# Patient Record
Sex: FEMALE | NOT HISPANIC OR LATINO
[De-identification: names, ages, dates, MRNs, and addresses within clinical notes are randomized per-mention and may not be internally consistent; named-entity substitution may affect disease eponyms.]

---

## 2021-01-13 PROBLEM — Z00.00 ENCOUNTER FOR PREVENTIVE HEALTH EXAMINATION: Status: ACTIVE | Noted: 2021-01-13

## 2021-01-14 ENCOUNTER — APPOINTMENT (OUTPATIENT)
Dept: CARDIOLOGY | Facility: CLINIC | Age: 71
End: 2021-01-14
Payer: MEDICARE

## 2021-01-14 VITALS
OXYGEN SATURATION: 99 % | HEIGHT: 58.5 IN | SYSTOLIC BLOOD PRESSURE: 152 MMHG | BODY MASS INDEX: 31.26 KG/M2 | WEIGHT: 153 LBS | HEART RATE: 75 BPM | DIASTOLIC BLOOD PRESSURE: 88 MMHG
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
## 2021-01-14 DIAGNOSIS — F41.9 ANXIETY DISORDER, UNSPECIFIED: ICD-10-CM

## 2021-01-14 DIAGNOSIS — Z78.9 OTHER SPECIFIED HEALTH STATUS: ICD-10-CM

## 2021-01-14 PROCEDURE — 99214 OFFICE O/P EST MOD 30 MIN: CPT

## 2021-01-14 RX ORDER — AMLODIPINE BESYLATE 5 MG/1
5 TABLET ORAL DAILY
Qty: 90 | Refills: 1 | Status: ACTIVE | COMMUNITY

## 2021-01-14 NOTE — PHYSICAL EXAM
[General Appearance - Well Developed] : well developed [Normal Appearance] : normal appearance [Well Groomed] : well groomed [General Appearance - Well Nourished] : well nourished [No Deformities] : no deformities [General Appearance - In No Acute Distress] : no acute distress [FreeTextEntry1] : no JVD [Respiration, Rhythm And Depth] : normal respiratory rhythm and effort [Exaggerated Use Of Accessory Muscles For Inspiration] : no accessory muscle use [Auscultation Breath Sounds / Voice Sounds] : lungs were clear to auscultation bilaterally [Heart Rate And Rhythm] : heart rate and rhythm were normal [Heart Sounds] : normal S1 and S2 [Murmurs] : no murmurs present [Abdomen Soft] : soft [Abdomen Tenderness] : non-tender [Abdomen Mass (___ Cm)] : no abdominal mass palpated [Abnormal Walk] : normal gait [Gait - Sufficient For Exercise Testing] : the gait was sufficient for exercise testing [Nail Clubbing] : no clubbing of the fingernails [Cyanosis, Localized] : no localized cyanosis [Petechial Hemorrhages (___cm)] : no petechial hemorrhages [Skin Color & Pigmentation] : normal skin color and pigmentation [No Venous Stasis] : no venous stasis [] : no rash [Skin Lesions] : no skin lesions [No Skin Ulcers] : no skin ulcer [No Xanthoma] : no  xanthoma was observed [Oriented To Time, Place, And Person] : oriented to person, place, and time [Affect] : the affect was normal [Mood] : the mood was normal [No Anxiety] : not feeling anxious

## 2021-01-14 NOTE — HISTORY OF PRESENT ILLNESS
[FreeTextEntry1] : EUSEBIO ANDRADE  is a 70 year F  who presents today Jan 14, 2021 in clinical follow-up for evaluation at [facility] for uncontrolled blood pressure. Now taking Amlodipine 5mg QD. There were several ER visits for high blood pressure. She is asymptomatic from a cardiovascular and arrhythmia standpoint. She has not been drinking any alcohol. Exercising on a regular basis with walking and riding bike with no new exertional complaints.\par \par Today she denies chest pain, pressure, unusual shortness of breath, lightheadedness, dizziness, near syncope or syncope.\par \par There is no prior history of myocardial infarction, coronary revascularization, history of ischemic heart disease, or symptomatic congestive heart failure. There is no history of symptomatic arrhythmias including atrial fibrillation.  \par Denies history of DM or hyperlipidemia. \par Denies history of tobacco use. \par \par \par \par TESTING:\par \par 1/9/2021 WBC 7.68, Hgb 14.3, Hct 43, plat 250, Sodium 138, potassium 4.8, BUN 16, Creat 0.62\par \par Renal doppler 1/10/2021 no sonographic evidence to suggest renal artery stenosisi

## 2021-01-20 ENCOUNTER — NON-APPOINTMENT (OUTPATIENT)
Age: 71
End: 2021-01-20

## 2021-02-04 ENCOUNTER — APPOINTMENT (OUTPATIENT)
Dept: CARDIOLOGY | Facility: CLINIC | Age: 71
End: 2021-02-04
Payer: MEDICARE

## 2021-02-04 VITALS
OXYGEN SATURATION: 98 % | HEIGHT: 70.5 IN | BODY MASS INDEX: 21.38 KG/M2 | HEART RATE: 72 BPM | WEIGHT: 151 LBS | TEMPERATURE: 98 F | SYSTOLIC BLOOD PRESSURE: 132 MMHG | DIASTOLIC BLOOD PRESSURE: 90 MMHG

## 2021-02-04 DIAGNOSIS — I10 ESSENTIAL (PRIMARY) HYPERTENSION: ICD-10-CM

## 2021-02-04 PROCEDURE — 99214 OFFICE O/P EST MOD 30 MIN: CPT

## 2021-02-04 NOTE — REASON FOR VISIT
[Follow-Up - Clinic] : a clinic follow-up of [FreeTextEntry2] : cardiology preop colonscopy Dr Ybarra 3/16/21 [facility], hx colon polyp, labile HTN on amlodipine [FreeTextEntry1] : Shayla Bernardo is a 70-year-old female with history of labile hypertension, GERD, obesity, varicose vein laser surgery, colon polyps.\par \par Patient is walking 30 minutes without exertional symptoms.  Average home blood pressures at guideline goal on amlodipine 5 mg daily.  Patient is currently following a low-sodium diet no alcohol or caffeine.  Patient does have anxiety and is benefiting from meditation.\par \par Labs 1/9/2021 WBC 7.68, Hgb 14.3, Hct 43, plat 250, Sodium 138, potassium 4.8, BUN 16, Creat 0.62\par \par Renal doppler 1/10/2021 no sonographic evidence to suggest renal artery stenosis

## 2021-02-04 NOTE — PHYSICAL EXAM
[General Appearance - Well Developed] : well developed [Normal Appearance] : normal appearance [Well Groomed] : well groomed [No Deformities] : no deformities [General Appearance - Well Nourished] : well nourished [General Appearance - In No Acute Distress] : no acute distress [Respiration, Rhythm And Depth] : normal respiratory rhythm and effort [Exaggerated Use Of Accessory Muscles For Inspiration] : no accessory muscle use [Auscultation Breath Sounds / Voice Sounds] : lungs were clear to auscultation bilaterally [Heart Rate And Rhythm] : heart rate and rhythm were normal [Murmurs] : no murmurs present [Heart Sounds] : normal S1 and S2 [Abdomen Soft] : soft [Abdomen Tenderness] : non-tender [Abdomen Mass (___ Cm)] : no abdominal mass palpated [Abnormal Walk] : normal gait [Gait - Sufficient For Exercise Testing] : the gait was sufficient for exercise testing [Nail Clubbing] : no clubbing of the fingernails [Cyanosis, Localized] : no localized cyanosis [Petechial Hemorrhages (___cm)] : no petechial hemorrhages [Skin Color & Pigmentation] : normal skin color and pigmentation [] : no rash [No Venous Stasis] : no venous stasis [Skin Lesions] : no skin lesions [No Skin Ulcers] : no skin ulcer [No Xanthoma] : no  xanthoma was observed [Oriented To Time, Place, And Person] : oriented to person, place, and time [Affect] : the affect was normal [Mood] : the mood was normal [No Anxiety] : not feeling anxious [FreeTextEntry1] : no JVD

## 2021-02-04 NOTE — ASSESSMENT
[FreeTextEntry1] : Shayla Bernardo is a 70-year-old female with medical history detailed above and active medical issues including:\par \par - Cardiology preop colonscopy Dr Ybarra 3/16/21 [facility].  Patient is optimized from a cardiovascular perspective and may proceed with surgery.  Patient currently not on anticoagulation or aspirin therapy.  Continue amlodipine up to the time of surgery for BP control.  Please call with any further questions.\par \par -Labile hypertension guideline goal on amlodipine 5 mg daily.\par \par - History of colon polyps\par \par - Reflux GERD\par \par -Obesity.  Discussed calorie reduction and increased exercise as a weight reduction strategy.\par \par -Leg varicose vein laser surgery Dr Wheat\par \par Advised patient to follow active lifestyle with regular cardiovascular exercise. Patient educated on lifestyle and diet modification with low sodium low fat diet and avoidance of excessive alcohol. Patient is aware to call with any symptoms or concerns. \par \par Patient will have 2-D echocardiogram to assess LV systolic function, structural heart disease.  Carotid and abdominal ultrasound to assess for obstructive PAD  with TEB followup..  Cardiology follow-up 6 months. \par \par Shayla Bernardo will follow up with Dr. Frank Albrecht for primary care

## 2021-02-22 ENCOUNTER — TRANSCRIPTION ENCOUNTER (OUTPATIENT)
Age: 71
End: 2021-02-22

## 2021-03-09 ENCOUNTER — APPOINTMENT (OUTPATIENT)
Dept: CARDIOLOGY | Facility: CLINIC | Age: 71
End: 2021-03-09

## 2021-03-29 ENCOUNTER — APPOINTMENT (OUTPATIENT)
Dept: CARDIOLOGY | Facility: CLINIC | Age: 71
End: 2021-03-29
Payer: MEDICARE

## 2021-03-29 PROCEDURE — 93306 TTE W/DOPPLER COMPLETE: CPT

## 2021-03-29 PROCEDURE — 93880 EXTRACRANIAL BILAT STUDY: CPT

## 2021-03-29 PROCEDURE — 93979 VASCULAR STUDY: CPT

## 2021-04-06 ENCOUNTER — APPOINTMENT (OUTPATIENT)
Dept: CARDIOLOGY | Facility: CLINIC | Age: 71
End: 2021-04-06
Payer: MEDICARE

## 2021-04-06 PROCEDURE — 99443: CPT | Mod: 95

## 2021-04-06 RX ORDER — NEOMYCIN AND POLYMYXIN B SULFATES AND DEXAMETHASONE 3.5; 10000; 1 MG/G; [IU]/G; MG/G
3.5-10000-0.1 OINTMENT OPHTHALMIC
Qty: 4 | Refills: 0 | Status: ACTIVE | COMMUNITY
Start: 2020-11-18

## 2021-04-06 RX ORDER — DORZOLAMIDE HYDROCHLORIDE TIMOLOL MALEATE 20; 5 MG/ML; MG/ML
22.3-6.8 SOLUTION/ DROPS OPHTHALMIC
Qty: 10 | Refills: 0 | Status: ACTIVE | COMMUNITY
Start: 2021-01-19

## 2021-04-06 RX ORDER — LATANOPROST/PF 0.005 %
0.01 DROPS OPHTHALMIC (EYE)
Qty: 2 | Refills: 0 | Status: ACTIVE | COMMUNITY
Start: 2021-01-19

## 2021-04-06 RX ORDER — METOPROLOL TARTRATE 50 MG/1
50 TABLET, FILM COATED ORAL
Qty: 30 | Refills: 0 | Status: DISCONTINUED | COMMUNITY
Start: 2021-01-10 | End: 2021-04-06

## 2021-09-09 ENCOUNTER — APPOINTMENT (OUTPATIENT)
Dept: CARDIOLOGY | Facility: CLINIC | Age: 71
End: 2021-09-09
Payer: MEDICARE

## 2021-09-09 VITALS
HEIGHT: 58 IN | OXYGEN SATURATION: 99 % | SYSTOLIC BLOOD PRESSURE: 132 MMHG | DIASTOLIC BLOOD PRESSURE: 64 MMHG | TEMPERATURE: 97.6 F | BODY MASS INDEX: 29.81 KG/M2 | HEART RATE: 66 BPM | WEIGHT: 142 LBS

## 2021-09-09 PROCEDURE — 99214 OFFICE O/P EST MOD 30 MIN: CPT

## 2021-09-09 NOTE — PHYSICAL EXAM
[General Appearance - Well Developed] : well developed [Normal Appearance] : normal appearance [Well Groomed] : well groomed [General Appearance - Well Nourished] : well nourished [No Deformities] : no deformities [General Appearance - In No Acute Distress] : no acute distress [Respiration, Rhythm And Depth] : normal respiratory rhythm and effort [Exaggerated Use Of Accessory Muscles For Inspiration] : no accessory muscle use [Auscultation Breath Sounds / Voice Sounds] : lungs were clear to auscultation bilaterally [Heart Rate And Rhythm] : heart rate and rhythm were normal [Heart Sounds] : normal S1 and S2 [Murmurs] : no murmurs present [Abdomen Soft] : soft [Abdomen Tenderness] : non-tender [Abdomen Mass (___ Cm)] : no abdominal mass palpated [Abnormal Walk] : normal gait [Gait - Sufficient For Exercise Testing] : the gait was sufficient for exercise testing [Nail Clubbing] : no clubbing of the fingernails [Cyanosis, Localized] : no localized cyanosis [Petechial Hemorrhages (___cm)] : no petechial hemorrhages [Skin Color & Pigmentation] : normal skin color and pigmentation [] : no rash [No Venous Stasis] : no venous stasis [Skin Lesions] : no skin lesions [No Skin Ulcers] : no skin ulcer [No Xanthoma] : no  xanthoma was observed [Oriented To Time, Place, And Person] : oriented to person, place, and time [Affect] : the affect was normal [Mood] : the mood was normal [No Anxiety] : not feeling anxious [Well Developed] : well developed [Well Nourished] : well nourished [No Acute Distress] : no acute distress [Normal Conjunctiva] : normal conjunctiva [Normal Venous Pressure] : normal venous pressure [No Carotid Bruit] : no carotid bruit [Normal S1, S2] : normal S1, S2 [No Murmur] : no murmur [No Rub] : no rub [No Gallop] : no gallop [Clear Lung Fields] : clear lung fields [Good Air Entry] : good air entry [No Respiratory Distress] : no respiratory distress  [Soft] : abdomen soft [Non Tender] : non-tender [No Masses/organomegaly] : no masses/organomegaly [Normal Bowel Sounds] : normal bowel sounds [Normal Gait] : normal gait [No Edema] : no edema [No Cyanosis] : no cyanosis [No Clubbing] : no clubbing [No Varicosities] : no varicosities [No Rash] : no rash [No Skin Lesions] : no skin lesions [Moves all extremities] : moves all extremities [No Focal Deficits] : no focal deficits [Normal Speech] : normal speech [Alert and Oriented] : alert and oriented [Normal memory] : normal memory [FreeTextEntry1] : no JVD

## 2021-09-09 NOTE — ASSESSMENT
[FreeTextEntry1] : Shayla Bernardo is a 71-year-old female with medical history detailed above and active medical issues including:\par \par - No anginal symptoms, multiple CAD risk factors, patient declines performing a current stress test.\par \par -Labile hypertension average BPs at guideline goal on amlodipine 5 mg daily.\par \par - History of colon polyps\par \par - Reflux GERD\par \par -Obesity.  Discussed calorie reduction and increased exercise as a weight reduction strategy.\par \par -Leg varicose vein laser surgery Dr Wheat\par \par Advised patient to follow active lifestyle with regular cardiovascular exercise. Patient educated on lifestyle and diet modification with low sodium low fat diet and avoidance of excessive alcohol. Patient is aware to call with any symptoms or concerns. \par \par Cardiology follow-up 6 months.  Current cardiac medications remain unchanged and renewals  are up to date. Repeat labs will be ordered with PMD.\par \par Shayla Bernardo will follow up with Dr. Frank Albrecht for primary care

## 2021-09-09 NOTE — REASON FOR VISIT
[Other: ____] : [unfilled] [FreeTextEntry1] : Shayla Bernardo is a 71-year-old female with history of labile hypertension, GERD, obesity, varicose vein laser surgery, colon polyps.\par \par Cardiovascular review of symptoms is negative for exertional chest pain, dyspnea, palpitations, dizziness or syncope.  No PND or orthopnea leg edema.  No bleeding or black stool.\par \par Patient is walking 30 minutes without exertional symptoms.  Average home blood pressures at guideline goal on amlodipine 5 mg daily.  Patient is currently following a low-sodium diet no alcohol or caffeine.  Patient does have anxiety and is benefiting from meditation.  Declines performing a current stress test. \par \par Labs 1/9/2021 WBC 7.68, Hgb 14.3, Hct 43, plat 250, Sodium 138, potassium 4.8, BUN 16, Creat 0.62\par \par Renal doppler 1/10/2021 no sonographic evidence to suggest renal artery stenosis

## 2022-04-28 ENCOUNTER — APPOINTMENT (OUTPATIENT)
Dept: CARDIOLOGY | Facility: CLINIC | Age: 72
End: 2022-04-28
Payer: MEDICARE

## 2022-04-28 VITALS
WEIGHT: 156 LBS | HEART RATE: 66 BPM | DIASTOLIC BLOOD PRESSURE: 64 MMHG | OXYGEN SATURATION: 97 % | SYSTOLIC BLOOD PRESSURE: 124 MMHG | TEMPERATURE: 96.9 F | BODY MASS INDEX: 32.75 KG/M2 | HEIGHT: 58 IN

## 2022-04-28 PROCEDURE — 99214 OFFICE O/P EST MOD 30 MIN: CPT

## 2022-04-28 NOTE — PHYSICAL EXAM
[Well Developed] : well developed [Well Nourished] : well nourished [No Acute Distress] : no acute distress [Normal Conjunctiva] : normal conjunctiva [Normal Venous Pressure] : normal venous pressure [No Carotid Bruit] : no carotid bruit [Normal S1, S2] : normal S1, S2 [No Murmur] : no murmur [No Rub] : no rub [No Gallop] : no gallop [Clear Lung Fields] : clear lung fields [No Respiratory Distress] : no respiratory distress  [Good Air Entry] : good air entry [Soft] : abdomen soft [Non Tender] : non-tender [No Masses/organomegaly] : no masses/organomegaly [Normal Bowel Sounds] : normal bowel sounds [Normal Gait] : normal gait [No Edema] : no edema [No Cyanosis] : no cyanosis [No Clubbing] : no clubbing [No Varicosities] : no varicosities [No Rash] : no rash [No Skin Lesions] : no skin lesions [Moves all extremities] : moves all extremities [No Focal Deficits] : no focal deficits [Normal Speech] : normal speech [Alert and Oriented] : alert and oriented [Normal memory] : normal memory [General Appearance - Well Developed] : well developed [Normal Appearance] : normal appearance [Well Groomed] : well groomed [General Appearance - Well Nourished] : well nourished [No Deformities] : no deformities [General Appearance - In No Acute Distress] : no acute distress [Respiration, Rhythm And Depth] : normal respiratory rhythm and effort [Exaggerated Use Of Accessory Muscles For Inspiration] : no accessory muscle use [Auscultation Breath Sounds / Voice Sounds] : lungs were clear to auscultation bilaterally [Heart Rate And Rhythm] : heart rate and rhythm were normal [Heart Sounds] : normal S1 and S2 [Murmurs] : no murmurs present [Abdomen Soft] : soft [Abdomen Tenderness] : non-tender [Abdomen Mass (___ Cm)] : no abdominal mass palpated [Abnormal Walk] : normal gait [Gait - Sufficient For Exercise Testing] : the gait was sufficient for exercise testing [Nail Clubbing] : no clubbing of the fingernails [Cyanosis, Localized] : no localized cyanosis [Petechial Hemorrhages (___cm)] : no petechial hemorrhages [Skin Color & Pigmentation] : normal skin color and pigmentation [] : no rash [No Venous Stasis] : no venous stasis [Skin Lesions] : no skin lesions [No Skin Ulcers] : no skin ulcer [No Xanthoma] : no  xanthoma was observed [Oriented To Time, Place, And Person] : oriented to person, place, and time [Affect] : the affect was normal [Mood] : the mood was normal [No Anxiety] : not feeling anxious [FreeTextEntry1] : no JVD

## 2022-04-28 NOTE — REASON FOR VISIT
[Other: ____] : [unfilled] [FreeTextEntry1] : Shayla Bernardo is a 72-year-old female with history of labile hypertension, GERD, obesity, varicose vein laser surgery, colon polyps.\par \par Cardiovascular review of symptoms is negative for exertional chest pain, dyspnea, palpitations, dizziness or syncope.  No PND or orthopnea leg edema.  No bleeding or black stool.\par \par Patient is walking 30 minutes without exertional symptoms.  Average home blood pressures at guideline goal on amlodipine 5 mg daily.  Patient is currently following a low-sodium diet no alcohol or caffeine.  Patient does have anxiety and is benefiting from meditation.  Declines performing a current stress test. \par \par Echocardiogram March 2021, LVEF 60%, mild MR and TR, mild PAH. \par \par Labs 1/9/2021 WBC 7.68, Hgb 14.3, Hct 43, plat 250, Sodium 138, potassium 4.8, BUN 16, Creat 0.62\par \par Renal doppler 1/10/2021 no sonographic evidence to suggest renal artery stenosis

## 2022-04-28 NOTE — ASSESSMENT
[FreeTextEntry1] : Shayla Bernardo is a 72-year-old female with medical history detailed above and active medical issues including:\par \par - No anginal symptoms, multiple CAD risk factors, patient declines performing a current stress test.\par \par - Labile hypertension average BPs at guideline goal on amlodipine 5 mg daily.\par \par - History of colon polyps\par \par - Reflux GERD\par \par - Obesity.  Discussed calorie reduction and increased exercise as a weight reduction strategy.\par \par - Leg varicose vein laser surgery Dr Wheat\par \par Advised patient to follow active lifestyle with regular cardiovascular exercise. Patient educated on lifestyle and diet modification with low sodium low fat diet and avoidance of excessive alcohol. Patient is aware to call with any symptoms or concerns. \par \par Cardiology follow-up 6 months same-day echocardiogram and Doppler studies.  Current cardiac medications remain unchanged and renewals  are up to date. Repeat labs will be ordered with PMD.\par \par Shayla Bernardo will follow up with Dr. Frank Albrecht for primary care

## 2022-09-20 PROBLEM — K21.9 GERD (GASTROESOPHAGEAL REFLUX DISEASE): Status: ACTIVE | Noted: 2021-01-14

## 2022-09-22 ENCOUNTER — APPOINTMENT (OUTPATIENT)
Dept: CARDIOLOGY | Facility: CLINIC | Age: 72
End: 2022-09-22

## 2022-09-22 ENCOUNTER — NON-APPOINTMENT (OUTPATIENT)
Age: 72
End: 2022-09-22

## 2022-09-22 VITALS
WEIGHT: 149 LBS | OXYGEN SATURATION: 99 % | SYSTOLIC BLOOD PRESSURE: 138 MMHG | HEART RATE: 53 BPM | HEIGHT: 58 IN | DIASTOLIC BLOOD PRESSURE: 82 MMHG | BODY MASS INDEX: 31.28 KG/M2 | TEMPERATURE: 97.1 F

## 2022-09-22 DIAGNOSIS — K21.9 GASTRO-ESOPHAGEAL REFLUX DISEASE W/OUT ESOPHAGITIS: ICD-10-CM

## 2022-09-22 PROCEDURE — 93880 EXTRACRANIAL BILAT STUDY: CPT

## 2022-09-22 PROCEDURE — 99215 OFFICE O/P EST HI 40 MIN: CPT

## 2022-09-22 PROCEDURE — 93306 TTE W/DOPPLER COMPLETE: CPT

## 2022-09-22 PROCEDURE — 93979 VASCULAR STUDY: CPT

## 2022-09-22 NOTE — REASON FOR VISIT
[Other: ____] : [unfilled] [FreeTextEntry1] : Shayla Bernardo is a 72-year-old female with history of labile hypertension, GERD, obesity, varicose vein laser surgery, colon polyps.\par \par Patient has dyspnea with moderate exertion.  Cardiovascular review of symptoms is negative for exertional chest pain, palpitations, dizziness or syncope.  No PND or orthopnea leg edema.  No bleeding or black stool.\par \par Patient is walking 30 minutes without exertional symptoms.  Average home blood pressures at guideline goal on amlodipine 5 mg daily.  Patient is currently following a low-sodium diet no alcohol or caffeine.  Patient does have anxiety and is benefiting from meditation.  Declines performing a current stress test.\par \par EKG Sept 2022 sinus rhythm anterior T wave inversions consistent with ischemia\par \par Echocardiogram Sept 2022 LVEF 60%, mild MR and TR, mild PH\par \par Carotid and abdominal ultrasound Sept 2022, mild nonobstructive plaque, normal abdominal aortic size. \par \par Echocardiogram March 2021, LVEF 60%, mild MR and TR, mild PAH. \par \par Labs 1/9/2021 WBC 7.68, Hgb 14.3, Hct 43, plat 250, Sodium 138, potassium 4.8, BUN 16, Creat 0.62\par \par Renal doppler 1/10/2021 no sonographic evidence to suggest renal artery stenosis

## 2022-09-22 NOTE — PHYSICAL EXAM
[Well Developed] : well developed [Well Nourished] : well nourished [No Acute Distress] : no acute distress [Normal Conjunctiva] : normal conjunctiva [Normal Venous Pressure] : normal venous pressure [No Carotid Bruit] : no carotid bruit [Normal S1, S2] : normal S1, S2 [No Murmur] : no murmur [No Rub] : no rub [No Gallop] : no gallop [Clear Lung Fields] : clear lung fields [Good Air Entry] : good air entry [No Respiratory Distress] : no respiratory distress  [Soft] : abdomen soft [Non Tender] : non-tender [No Masses/organomegaly] : no masses/organomegaly [Normal Bowel Sounds] : normal bowel sounds [Normal Gait] : normal gait [No Edema] : no edema [No Cyanosis] : no cyanosis [No Clubbing] : no clubbing [No Varicosities] : no varicosities [No Rash] : no rash [No Skin Lesions] : no skin lesions [Moves all extremities] : moves all extremities [No Focal Deficits] : no focal deficits [Normal Speech] : normal speech [Alert and Oriented] : alert and oriented [Normal memory] : normal memory [General Appearance - Well Developed] : well developed [Normal Appearance] : normal appearance [Well Groomed] : well groomed [General Appearance - Well Nourished] : well nourished [No Deformities] : no deformities [General Appearance - In No Acute Distress] : no acute distress [Respiration, Rhythm And Depth] : normal respiratory rhythm and effort [Exaggerated Use Of Accessory Muscles For Inspiration] : no accessory muscle use [Auscultation Breath Sounds / Voice Sounds] : lungs were clear to auscultation bilaterally [Heart Rate And Rhythm] : heart rate and rhythm were normal [Heart Sounds] : normal S1 and S2 [Murmurs] : no murmurs present [Abdomen Soft] : soft [Abdomen Tenderness] : non-tender [Abdomen Mass (___ Cm)] : no abdominal mass palpated [Abnormal Walk] : normal gait [Gait - Sufficient For Exercise Testing] : the gait was sufficient for exercise testing [Nail Clubbing] : no clubbing of the fingernails [Cyanosis, Localized] : no localized cyanosis [Petechial Hemorrhages (___cm)] : no petechial hemorrhages [Skin Color & Pigmentation] : normal skin color and pigmentation [] : no rash [No Venous Stasis] : no venous stasis [Skin Lesions] : no skin lesions [No Skin Ulcers] : no skin ulcer [No Xanthoma] : no  xanthoma was observed [Oriented To Time, Place, And Person] : oriented to person, place, and time [Affect] : the affect was normal [Mood] : the mood was normal [No Anxiety] : not feeling anxious [FreeTextEntry1] : no JVD

## 2022-09-23 ENCOUNTER — NON-APPOINTMENT (OUTPATIENT)
Age: 72
End: 2022-09-23

## 2022-09-23 DIAGNOSIS — Z29.9 ENCOUNTER FOR PROPHYLACTIC MEASURES, UNSPECIFIED: ICD-10-CM

## 2022-09-27 ENCOUNTER — EMERGENCY (EMERGENCY)
Facility: HOSPITAL | Age: 72
LOS: 1 days | Discharge: HOME | End: 2022-09-27
Attending: EMERGENCY MEDICINE
Payer: MEDICARE

## 2022-09-27 ENCOUNTER — APPOINTMENT (OUTPATIENT)
Dept: CT IMAGING | Facility: CLINIC | Age: 72
End: 2022-09-27

## 2022-09-27 ENCOUNTER — NON-APPOINTMENT (OUTPATIENT)
Age: 72
End: 2022-09-27

## 2022-09-27 ENCOUNTER — OUTPATIENT (OUTPATIENT)
Dept: OUTPATIENT SERVICES | Facility: HOSPITAL | Age: 72
LOS: 1 days | End: 2022-09-27
Payer: MEDICARE

## 2022-09-27 VITALS
RESPIRATION RATE: 22 BRPM | OXYGEN SATURATION: 99 % | DIASTOLIC BLOOD PRESSURE: 104 MMHG | SYSTOLIC BLOOD PRESSURE: 176 MMHG | HEART RATE: 69 BPM | TEMPERATURE: 98 F | WEIGHT: 149.91 LBS

## 2022-09-27 VITALS
HEART RATE: 80 BPM | SYSTOLIC BLOOD PRESSURE: 151 MMHG | TEMPERATURE: 98 F | RESPIRATION RATE: 18 BRPM | OXYGEN SATURATION: 97 % | DIASTOLIC BLOOD PRESSURE: 83 MMHG

## 2022-09-27 DIAGNOSIS — Z01.810 ENCOUNTER FOR PREPROCEDURAL CARDIOVASCULAR EXAMINATION: ICD-10-CM

## 2022-09-27 PROCEDURE — 99284 EMERGENCY DEPT VISIT MOD MDM: CPT | Mod: GC

## 2022-09-27 PROCEDURE — 75574 CT ANGIO HRT W/3D IMAGE: CPT

## 2022-09-27 PROCEDURE — 99284 EMERGENCY DEPT VISIT MOD MDM: CPT | Mod: 25

## 2022-09-27 PROCEDURE — 93010 ELECTROCARDIOGRAM REPORT: CPT

## 2022-09-27 PROCEDURE — 96374 THER/PROPH/DIAG INJ IV PUSH: CPT

## 2022-09-27 PROCEDURE — 75574 CT ANGIO HRT W/3D IMAGE: CPT | Mod: 26,MH

## 2022-09-27 PROCEDURE — 93005 ELECTROCARDIOGRAM TRACING: CPT

## 2022-09-27 PROCEDURE — 96375 TX/PRO/DX INJ NEW DRUG ADDON: CPT

## 2022-09-27 RX ORDER — FAMOTIDINE 10 MG/ML
20 INJECTION INTRAVENOUS ONCE
Refills: 0 | Status: COMPLETED | OUTPATIENT
Start: 2022-09-27 | End: 2022-09-27

## 2022-09-27 RX ORDER — FAMOTIDINE 10 MG/ML
20 INJECTION INTRAVENOUS DAILY
Refills: 0 | Status: DISCONTINUED | OUTPATIENT
Start: 2022-09-27 | End: 2022-09-27

## 2022-09-27 RX ADMIN — FAMOTIDINE 20 MILLIGRAM(S): 10 INJECTION INTRAVENOUS at 14:42

## 2022-09-27 RX ADMIN — Medication 125 MILLIGRAM(S): at 14:42

## 2022-09-27 NOTE — ED PROVIDER NOTE - OBJECTIVE STATEMENT
72y Female with history of CAD BIBEMS from outside radiology center presenting with allergic reaction after IV contrast. Patient was premedicated prior to study however after IV contrast reports sensation of tight throat. Received benadryl en route by EMS.  Denies fevers, chills, headache, chest pain, palpitations, shortness of breath, cough, nausea, vomiting, diarrhea, hematuria, dysuria, dark stools, focal neurologic symptoms.

## 2022-09-27 NOTE — ED PROVIDER NOTE - PATIENT PORTAL LINK FT
You can access the FollowMyHealth Patient Portal offered by [facility] by registering at the following website: [email or web address]. By joining [facility]’s FollowMyHealth portal, you will also be able to view your health information using other applications (apps) compatible with our system.

## 2022-09-27 NOTE — ED PROVIDER NOTE - CLINICAL SUMMARY MEDICAL DECISION MAKING FREE TEXT BOX
72y Female with allergic reaction to IV contrast. Tolerating secretions, no lip or tongue swelling, no wheeze. Meds, reassess.

## 2022-09-27 NOTE — ED ADULT TRIAGE NOTE - CHIEF COMPLAINT QUOTE
sent from office post cat scan with IV contrast. as per EMS pt has allergy to IV contrast, post contrast pt throat swelling , difficultly swallowing, hoarse voice. pt states she cant swallow her secretions. was given benadryl 50 IVP in ems. had nitro and lopressor during procedure

## 2022-09-27 NOTE — ED PROVIDER NOTE - ATTENDING CONTRIBUTION TO CARE
I, Clifford Sweet, performed a face to face bedside interview with this patient regarding history of present illness, review of symptoms and relevant past medical, social and family history.  I completed an independent physical examination. I have communicated the patient’s plan of care and disposition with the resident.  Pt arived with allergic reaction 1.5 hours after receiving IV contrast. States she has tightness in her throat but able to swallow, no SOB  Gen: NAD, well appearing  HHENT: tolertaing secretion, no uvular edema, no stridor, no tongue swelling  CV: RRR  Pul: CTA b/l  Abd: Soft, non-distended, non-tender  Neuro: no focal deficits  Pt improved, Sx resolved, stable for dc

## 2022-09-27 NOTE — ED PROVIDER NOTE - PHYSICAL EXAMINATION
General: Well appearing in no acute distress. Alert and cooperative.   Head: Normocephalic, atraumatic.  Eyes: PERRLA. No conjunctival injection. No scleral icterus. EOMI  ENMT: Atraumatic external nose and ears. No lip or tongue swelling.   Neck: Soft and supple. Full ROM without pain.   Cardiac: Regular rate and regular rhythm. No murmurs.   Resp: Unlabored respiratory effort. Lungs CTAB. Speaking in full sentences. No wheezes.  Abd: Soft, non-tender, non-distended.   MSK: Spine midline and non-tender.   Skin: Warm and dry. No rashes.  Neuro: AO x 3. Moves all extremities symmetrically. Motor strength and sensation grossly intact.

## 2022-09-30 RX ORDER — ASPIRIN 81 MG/1
81 TABLET, COATED ORAL DAILY
Qty: 90 | Refills: 3 | Status: ACTIVE | COMMUNITY
Start: 2022-09-30

## 2022-09-30 RX ORDER — ROSUVASTATIN CALCIUM 5 MG/1
5 TABLET, FILM COATED ORAL DAILY
Qty: 90 | Refills: 3 | Status: ACTIVE | COMMUNITY
Start: 2022-09-30 | End: 1900-01-01

## 2022-10-11 PROBLEM — I25.10 ATHEROSCLEROTIC HEART DISEASE OF NATIVE CORONARY ARTERY WITHOUT ANGINA PECTORIS: Chronic | Status: ACTIVE | Noted: 2022-09-27

## 2022-11-30 ENCOUNTER — TRANSCRIPTION ENCOUNTER (OUTPATIENT)
Age: 72
End: 2022-11-30

## 2022-12-01 ENCOUNTER — APPOINTMENT (OUTPATIENT)
Dept: CARDIOLOGY | Facility: CLINIC | Age: 72
End: 2022-12-01

## 2022-12-01 VITALS
WEIGHT: 146 LBS | HEART RATE: 66 BPM | TEMPERATURE: 97.5 F | OXYGEN SATURATION: 99 % | SYSTOLIC BLOOD PRESSURE: 112 MMHG | DIASTOLIC BLOOD PRESSURE: 78 MMHG | BODY MASS INDEX: 30.64 KG/M2 | HEIGHT: 58 IN

## 2022-12-01 PROCEDURE — 99215 OFFICE O/P EST HI 40 MIN: CPT

## 2022-12-01 RX ORDER — ALPRAZOLAM 0.25 MG/1
0.25 TABLET ORAL
Qty: 30 | Refills: 0 | Status: ACTIVE | COMMUNITY
Start: 2022-11-18

## 2022-12-01 RX ORDER — ALPRAZOLAM 0.5 MG/1
0.5 TABLET ORAL
Qty: 15 | Refills: 0 | Status: COMPLETED | COMMUNITY
Start: 2021-02-19 | End: 2022-12-01

## 2022-12-01 RX ORDER — PREDNISONE 50 MG/1
50 TABLET ORAL
Qty: 3 | Refills: 0 | Status: COMPLETED | COMMUNITY
Start: 2022-09-23 | End: 2022-12-01

## 2022-12-01 RX ORDER — SERTRALINE 25 MG/1
25 TABLET, FILM COATED ORAL
Qty: 30 | Refills: 0 | Status: ACTIVE | COMMUNITY
Start: 2022-11-18

## 2022-12-01 RX ORDER — DIPHENHYDRAMINE HCL 50 MG/1
50 CAPSULE ORAL
Qty: 1 | Refills: 0 | Status: COMPLETED | COMMUNITY
Start: 2022-09-23 | End: 2022-12-01

## 2022-12-01 NOTE — ASSESSMENT
[FreeTextEntry1] : Shayla Bernardo is a 72-year-old female with medical history detailed above and active medical issues including:\par \par - Cardiology preop breast lumpectomy surgery Jan 2023 [facility].  Patient is optimized from a cardiovascular perspective and may proceed with surgery. Patient currently not on anticoagulation.  Aspirin may be held 1 week prior to surgery.  Continue amlodipine up to the time of surgery. Please call with any further questions.\par \par - Dyspnea on exertion, multiple CAD risk factors, abnormal baseline EKG.  Coronary CTA and coronary calcium score Sept 2022 nonobstructive coronaries, moderate coronary calcium score 131 total\par \par - Labile hypertension average BPs at guideline goal on amlodipine 5 mg daily.\par \par - History of colon polyps\par \par - Reflux GERD\par \par - Obesity.  Discussed calorie reduction and increased exercise as a weight reduction strategy.\par \par - Leg varicose vein laser surgery Dr Wheat\par \par Advised patient to follow active lifestyle with regular cardiovascular exercise. Patient educated on lifestyle and diet modification with low sodium low fat diet and avoidance of excessive alcohol. Patient is aware to call with any symptoms or concerns. \par \par Cardiology follow-up 6 months.  Current cardiac medications remain unchanged and renewals  are up to date. Repeat labs will be ordered with PMD.\par \par Shayla Bernardo will follow up with Dr. Frank Albrecht for primary care\par \par

## 2022-12-19 NOTE — ASSESSMENT
[FreeTextEntry1] : Shayla Bernardo is a 72-year-old female with medical history detailed above and active medical issues including:\par \par - Dyspnea on exertion, multiple CAD risk factors, abnormal baseline EKG.  Coronary CTA and coronary calcium score ordered to assess for obstructive CAD and risk stratification with telehealth followup\par \par - Labile hypertension average BPs at guideline goal on amlodipine 5 mg daily.\par \par - History of colon polyps\par \par - Reflux GERD\par \par - Obesity.  Discussed calorie reduction and increased exercise as a weight reduction strategy.\par \par - Leg varicose vein laser surgery Dr Wheat\par \par Advised patient to follow active lifestyle with regular cardiovascular exercise. Patient educated on lifestyle and diet modification with low sodium low fat diet and avoidance of excessive alcohol. Patient is aware to call with any symptoms or concerns. \par \par Cardiology follow-up 6 months.  Current cardiac medications remain unchanged and renewals  are up to date. Repeat labs will be ordered with PMD.\par \par Shayla Bernardo will follow up with Dr. Frank Albrecht for primary care\par \par  [Appropriately responsive] : appropriately responsive [Alert] : alert [No Acute Distress] : no acute distress [Soft] : soft [Non-tender] : non-tender [Examination Of The Breasts] : a normal appearance [No Masses] : no breast masses were palpable [Labia Majora] : normal [Labia Minora] : normal [Normal] : normal [Tenderness] : nontender [Enlarged ___ wks] : not enlarged [Mass ___ cm] : no uterine mass was palpated [FreeTextEntry5] : pap done [Uterine Adnexae] : non-palpable

## 2023-06-14 ENCOUNTER — APPOINTMENT (OUTPATIENT)
Dept: ORTHOPEDIC SURGERY | Facility: CLINIC | Age: 73
End: 2023-06-14
Payer: MEDICARE

## 2023-06-14 DIAGNOSIS — M94.261 CHONDROMALACIA, RIGHT KNEE: ICD-10-CM

## 2023-06-14 DIAGNOSIS — M17.11 UNILATERAL PRIMARY OSTEOARTHRITIS, RIGHT KNEE: ICD-10-CM

## 2023-06-14 PROCEDURE — 99204 OFFICE O/P NEW MOD 45 MIN: CPT

## 2023-06-14 PROCEDURE — 73562 X-RAY EXAM OF KNEE 3: CPT | Mod: RT

## 2023-06-14 NOTE — PHYSICAL EXAM
[4___] : hamstring 4[unfilled]/5 [Right] : right knee [Lateral] : lateral [[place]] : skyline [AP Standing] : anteroposterior standing [advanced tricompartmental OA with medial compartment narrowing and varus alignment] : advanced tricompartmental OA with medial compartment narrowing and varus alignment [Advanced patellofemoral OA] : Advanced patellofemoral OA [] : no extensor lag [[id number]] : flexion 130 degrees

## 2023-06-14 NOTE — HISTORY OF PRESENT ILLNESS
[Gradual] : gradual [Dull/Aching] : dull/aching [Throbbing] : throbbing [Intermittent] : intermittent [Leisure] : leisure [de-identified] : Patient presents today for evaluation of Right knee pain. Problem started about 6-7 years ago after the patient had a fall. Patient states she did see Dr Alvarez many years ago and was treated with PT which had helped with the pain. Patient states as of 3-4 months ago the pain has now returned. She is having increasing pain she states is intermittent worse with walking. Patient also complaining of swelling in the knee.  [] : no

## 2023-06-15 ENCOUNTER — APPOINTMENT (OUTPATIENT)
Dept: CARDIOLOGY | Facility: CLINIC | Age: 73
End: 2023-06-15
Payer: MEDICARE

## 2023-06-15 VITALS
WEIGHT: 155 LBS | HEART RATE: 59 BPM | DIASTOLIC BLOOD PRESSURE: 80 MMHG | OXYGEN SATURATION: 96 % | HEIGHT: 58 IN | BODY MASS INDEX: 32.54 KG/M2 | SYSTOLIC BLOOD PRESSURE: 150 MMHG

## 2023-06-15 PROCEDURE — 99214 OFFICE O/P EST MOD 30 MIN: CPT

## 2023-06-15 RX ORDER — ANASTROZOLE TABLETS 1 MG/1
TABLET ORAL
Refills: 0 | Status: ACTIVE | COMMUNITY

## 2023-06-15 NOTE — REASON FOR VISIT
[Other: ____] : [unfilled] [FreeTextEntry1] : Shayla Bernardo is a 73-year-old female with history of labile hypertension, GERD, obesity, varicose vein laser surgery, colon polyps, breast CA lumpectomy Jan 2023, RT chemo, moderate risk coronary calcium score 131 Sept 2022.\par \par Patient has dyspnea with moderate exertion.  Cardiovascular review of symptoms is negative for exertional chest pain, palpitations, dizziness or syncope.  No PND or orthopnea leg edema.  No bleeding or black stool.\par \par Patient is walking 30 minutes without exertional symptoms.  Average home blood pressures at guideline goal on amlodipine 5 mg daily.  Patient is currently following a low-sodium diet no alcohol or caffeine.  Patient does have anxiety and is benefiting from meditation.  Declines performing a current stress test.\par \par EKG Sept 2022 sinus rhythm anterior T wave inversions consistent with ischemia\par \par Echocardiogram Sept 2022 LVEF 60%, mild MR and TR, mild PH\par \par Carotid and abdominal ultrasound Sept 2022, mild nonobstructive plaque, normal abdominal aortic size. \par \par Echocardiogram March 2021, LVEF 60%, mild MR and TR, mild PAH. \par \par Labs 1/9/2021 WBC 7.68, Hgb 14.3, Hct 43, plat 250, Sodium 138, potassium 4.8, BUN 16, Creat 0.62\par \par Renal doppler 1/10/2021 no sonographic evidence to suggest renal artery stenosis

## 2023-06-15 NOTE — ASSESSMENT
[FreeTextEntry1] : Shayla Bernardo is a 72-year-old female with medical history detailed above and active medical issues including:\par \par - Breast CA lumpectomy Jan 2023, completed RT now on chemo \par \par - Dyspnea on exertion, multiple CAD risk factors, abnormal baseline EKG.  Coronary CTA and coronary calcium score Sept 2022 nonobstructive coronaries, moderate coronary calcium score 131 total\par \par - Labile hypertension average BPs at guideline goal on amlodipine 5 mg daily.\par \par - History of colon polyps\par \par - Reflux GERD\par \par - Obesity.  Discussed calorie reduction and increased exercise as a weight reduction strategy.\par \par - Leg varicose vein laser surgery Dr Wheat\par \par Advised patient to follow active lifestyle with regular cardiovascular exercise. Patient educated on lifestyle and diet modification with low sodium low fat diet and avoidance of excessive alcohol. Patient is aware to call with any symptoms or concerns. \par \par Cardiology follow-up 6 months.  Current cardiac medications remain unchanged and renewals  are up to date. Repeat labs will be ordered with PMD.\par \par Shayla Bernardo will follow up with Dr. Frank Albrecht for primary care\par \par

## 2023-08-22 NOTE — ED ADULT TRIAGE NOTE - INTERNATIONAL TRAVEL
Unable to Assess Cibinqo Pregnancy And Lactation Text: It is unknown if this medication will adversely affect pregnancy or breast feeding.  You should not take this medication if you are currently pregnant or planning a pregnancy or while breastfeeding.

## 2023-09-20 ENCOUNTER — RX ONLY (RX ONLY)
Age: 73
End: 2023-09-20

## 2023-09-20 ENCOUNTER — OFFICE (OUTPATIENT)
Dept: URBAN - METROPOLITAN AREA CLINIC 8 | Facility: CLINIC | Age: 73
Setting detail: OPHTHALMOLOGY
End: 2023-09-20
Payer: MEDICARE

## 2023-09-20 DIAGNOSIS — L30.8: ICD-10-CM

## 2023-09-20 DIAGNOSIS — H02.834: ICD-10-CM

## 2023-09-20 DIAGNOSIS — H40.1131: ICD-10-CM

## 2023-09-20 DIAGNOSIS — H25.13: ICD-10-CM

## 2023-09-20 DIAGNOSIS — H02.831: ICD-10-CM

## 2023-09-20 PROCEDURE — 92004 COMPRE OPH EXAM NEW PT 1/>: CPT | Performed by: OPHTHALMOLOGY

## 2023-09-20 PROCEDURE — 92133 CPTRZD OPH DX IMG PST SGM ON: CPT | Performed by: OPHTHALMOLOGY

## 2023-09-20 ASSESSMENT — KERATOMETRY
OS_K1POWER_DIOPTERS: 44.00
OS_AXISANGLE_DEGREES: 094
OD_AXISANGLE_DEGREES: 104
OD_K2POWER_DIOPTERS: 44.75
METHOD_AUTO_MANUAL: AUTO
OD_K1POWER_DIOPTERS: 44.00
OS_K2POWER_DIOPTERS: 45.00

## 2023-09-20 ASSESSMENT — REFRACTION_AUTOREFRACTION
OD_AXIS: 040
OS_CYLINDER: SPH
OS_SPHERE: -0.25
OD_SPHERE: +0.50
OD_CYLINDER: -0.50

## 2023-09-20 ASSESSMENT — CONFRONTATIONAL VISUAL FIELD TEST (CVF)
OD_FINDINGS: FULL
OS_FINDINGS: FULL

## 2023-09-20 ASSESSMENT — SPHEQUIV_DERIVED: OD_SPHEQUIV: 0.25

## 2023-09-20 ASSESSMENT — LID POSITION - DERMATOCHALASIS
OS_DERMATOCHALASIS: LUL
OD_DERMATOCHALASIS: RUL

## 2023-09-20 ASSESSMENT — AXIALLENGTH_DERIVED: OD_AL: 23.1804

## 2023-09-20 ASSESSMENT — VISUAL ACUITY
OD_BCVA: 20/30-2
OS_BCVA: 20/20-

## 2023-09-20 ASSESSMENT — TONOMETRY
OS_IOP_MMHG: 18
OD_IOP_MMHG: 18

## 2023-12-12 PROBLEM — R94.31 ABNORMAL ECG: Status: ACTIVE | Noted: 2022-09-22

## 2023-12-12 PROBLEM — Z01.810 PREOPERATIVE CARDIOVASCULAR EXAMINATION: Status: ACTIVE | Noted: 2021-02-04

## 2023-12-12 PROBLEM — E66.3 OVERWEIGHT: Status: ACTIVE | Noted: 2021-01-14

## 2023-12-12 PROBLEM — R93.1 ELEVATED CORONARY ARTERY CALCIUM SCORE: Status: ACTIVE | Noted: 2022-09-27

## 2023-12-19 ENCOUNTER — APPOINTMENT (OUTPATIENT)
Dept: CARDIOLOGY | Facility: CLINIC | Age: 73
End: 2023-12-19
Payer: MEDICARE

## 2023-12-19 VITALS
SYSTOLIC BLOOD PRESSURE: 154 MMHG | HEART RATE: 70 BPM | HEIGHT: 58 IN | DIASTOLIC BLOOD PRESSURE: 90 MMHG | OXYGEN SATURATION: 98 % | WEIGHT: 158 LBS | BODY MASS INDEX: 33.17 KG/M2

## 2023-12-19 DIAGNOSIS — Z01.810 ENCOUNTER FOR PREPROCEDURAL CARDIOVASCULAR EXAMINATION: ICD-10-CM

## 2023-12-19 DIAGNOSIS — R93.1 ABNORMAL FINDINGS ON DIAGNOSTIC IMAGING OF HEART AND CORONARY CIRCULATION: ICD-10-CM

## 2023-12-19 DIAGNOSIS — E66.3 OVERWEIGHT: ICD-10-CM

## 2023-12-19 DIAGNOSIS — R94.31 ABNORMAL ELECTROCARDIOGRAM [ECG] [EKG]: ICD-10-CM

## 2023-12-19 PROCEDURE — 93306 TTE W/DOPPLER COMPLETE: CPT

## 2023-12-19 PROCEDURE — 99215 OFFICE O/P EST HI 40 MIN: CPT

## 2023-12-19 NOTE — REASON FOR VISIT
[FreeTextEntry1] : Shayla Bernardo is a 73-year-old female with history of labile hypertension, GERD, obesity, varicose vein laser surgery, colon polyps, breast CA lumpectomy Jan 2023, RT chemo, moderate risk coronary calcium score 131 Sept 2022.  Patient has dyspnea with moderate exertion.  Cardiovascular review of symptoms is negative for exertional chest pain, palpitations, dizziness or syncope.  No PND or orthopnea leg edema.  No bleeding or black stool.  Patient is walking 30 minutes without exertional symptoms.  Average home blood pressures at guideline goal on amlodipine 5 mg daily.  Patient is currently following a low-sodium diet no alcohol or caffeine.  Patient does have anxiety and is benefiting from meditation.    EKG Dec 2023 sinus rhythm anterior T wave inversions consistent with ischemia unchanged from prior EKG Sept 2022  Echocardiogram Sept 2022 LVEF 60%, mild MR and TR, mild PH  Carotid and abdominal ultrasound Sept 2022, mild nonobstructive plaque, normal abdominal aortic size.   Echocardiogram March 2021, LVEF 60%, mild MR and TR, mild PAH.   Labs 1/9/2021 WBC 7.68, Hgb 14.3, Hct 43, plat 250, Sodium 138, potassium 4.8, BUN 16, Creat 0.62  Renal doppler 1/10/2021 no sonographic evidence to suggest renal artery stenosis

## 2024-02-28 ENCOUNTER — OFFICE (OUTPATIENT)
Dept: URBAN - METROPOLITAN AREA CLINIC 8 | Facility: CLINIC | Age: 74
Setting detail: OPHTHALMOLOGY
End: 2024-02-28
Payer: MEDICARE

## 2024-02-28 DIAGNOSIS — H25.13: ICD-10-CM

## 2024-02-28 DIAGNOSIS — H40.1131: ICD-10-CM

## 2024-02-28 DIAGNOSIS — H02.831: ICD-10-CM

## 2024-02-28 DIAGNOSIS — H02.834: ICD-10-CM

## 2024-02-28 DIAGNOSIS — L30.8: ICD-10-CM

## 2024-02-28 PROCEDURE — 92133 CPTRZD OPH DX IMG PST SGM ON: CPT | Performed by: OPHTHALMOLOGY

## 2024-02-28 PROCEDURE — 99213 OFFICE O/P EST LOW 20 MIN: CPT | Performed by: OPHTHALMOLOGY

## 2024-02-28 PROCEDURE — 92083 EXTENDED VISUAL FIELD XM: CPT | Performed by: OPHTHALMOLOGY

## 2024-02-28 ASSESSMENT — REFRACTION_CURRENTRX
OD_VPRISM_DIRECTION: PROGS
OD_ADD: +2.25
OS_OVR_VA: 20/
OS_CYLINDER: SPHERE
OS_VPRISM_DIRECTION: PROGS
OS_ADD: +2.25
OS_SPHERE: -0.75
OD_SPHERE: -0.50
OD_OVR_VA: 20/
OD_CYLINDER: -0.75
OD_AXIS: 009

## 2024-02-28 ASSESSMENT — REFRACTION_AUTOREFRACTION
OS_CYLINDER: SPH
OS_SPHERE: -0.25
OD_AXIS: 040
OD_CYLINDER: -0.50
OD_SPHERE: +0.50

## 2024-02-28 ASSESSMENT — CONFRONTATIONAL VISUAL FIELD TEST (CVF)
OS_FINDINGS: FULL
OD_FINDINGS: FULL

## 2024-02-28 ASSESSMENT — SPHEQUIV_DERIVED: OD_SPHEQUIV: 0.25

## 2024-02-28 ASSESSMENT — LID POSITION - DERMATOCHALASIS
OS_DERMATOCHALASIS: LUL
OD_DERMATOCHALASIS: RUL

## 2024-03-26 DIAGNOSIS — I25.10 ATHEROSCLEROTIC HEART DISEASE OF NATIVE CORONARY ARTERY W/OUT ANGINA PECTORIS: ICD-10-CM

## 2024-03-26 DIAGNOSIS — R06.09 OTHER FORMS OF DYSPNEA: ICD-10-CM

## 2024-03-26 DIAGNOSIS — I10 ESSENTIAL (PRIMARY) HYPERTENSION: ICD-10-CM

## 2024-07-24 ENCOUNTER — APPOINTMENT (OUTPATIENT)
Dept: CARDIOLOGY | Facility: CLINIC | Age: 74
End: 2024-07-24
Payer: MEDICARE

## 2024-07-24 VITALS
BODY MASS INDEX: 33.58 KG/M2 | HEART RATE: 66 BPM | DIASTOLIC BLOOD PRESSURE: 84 MMHG | OXYGEN SATURATION: 98 % | SYSTOLIC BLOOD PRESSURE: 148 MMHG | WEIGHT: 160 LBS | HEIGHT: 58 IN

## 2024-07-24 DIAGNOSIS — Z01.810 ENCOUNTER FOR PREPROCEDURAL CARDIOVASCULAR EXAMINATION: ICD-10-CM

## 2024-07-24 DIAGNOSIS — I10 ESSENTIAL (PRIMARY) HYPERTENSION: ICD-10-CM

## 2024-07-24 DIAGNOSIS — K21.9 GASTRO-ESOPHAGEAL REFLUX DISEASE W/OUT ESOPHAGITIS: ICD-10-CM

## 2024-07-24 DIAGNOSIS — R93.1 ABNORMAL FINDINGS ON DIAGNOSTIC IMAGING OF HEART AND CORONARY CIRCULATION: ICD-10-CM

## 2024-07-24 DIAGNOSIS — M94.261 CHONDROMALACIA, RIGHT KNEE: ICD-10-CM

## 2024-07-24 DIAGNOSIS — R94.31 ABNORMAL ELECTROCARDIOGRAM [ECG] [EKG]: ICD-10-CM

## 2024-07-24 DIAGNOSIS — R06.09 OTHER FORMS OF DYSPNEA: ICD-10-CM

## 2024-07-24 PROCEDURE — G2211 COMPLEX E/M VISIT ADD ON: CPT

## 2024-07-24 PROCEDURE — 99215 OFFICE O/P EST HI 40 MIN: CPT

## 2024-07-24 NOTE — PHYSICAL EXAM
[Well Developed] : well developed [Well Nourished] : well nourished [No Acute Distress] : no acute distress [Normal Conjunctiva] : normal conjunctiva [Normal Venous Pressure] : normal venous pressure [No Carotid Bruit] : no carotid bruit [Normal S1, S2] : normal S1, S2 [No Murmur] : no murmur [No Rub] : no rub [No Gallop] : no gallop [Clear Lung Fields] : clear lung fields [Good Air Entry] : good air entry [No Respiratory Distress] : no respiratory distress  [Soft] : abdomen soft [Non Tender] : non-tender [No Masses/organomegaly] : no masses/organomegaly [Normal Bowel Sounds] : normal bowel sounds [Normal Gait] : normal gait [No Edema] : no edema [No Cyanosis] : no cyanosis [No Clubbing] : no clubbing [No Varicosities] : no varicosities [No Rash] : no rash [No Skin Lesions] : no skin lesions [Moves all extremities] : moves all extremities [No Focal Deficits] : no focal deficits [Normal Speech] : normal speech [Alert and Oriented] : alert and oriented [Normal memory] : normal memory [General Appearance - Well Developed] : well developed [Normal Appearance] : normal appearance [Well Groomed] : well groomed [General Appearance - Well Nourished] : well nourished [No Deformities] : no deformities [General Appearance - In No Acute Distress] : no acute distress [Respiration, Rhythm And Depth] : normal respiratory rhythm and effort [Exaggerated Use Of Accessory Muscles For Inspiration] : no accessory muscle use [Auscultation Breath Sounds / Voice Sounds] : lungs were clear to auscultation bilaterally [Heart Rate And Rhythm] : heart rate and rhythm were normal [Heart Sounds] : normal S1 and S2 [Murmurs] : no murmurs present [Abdomen Soft] : soft [Abdomen Tenderness] : non-tender [Abdomen Mass (___ Cm)] : no abdominal mass palpated [Abnormal Walk] : normal gait [Gait - Sufficient For Exercise Testing] : the gait was sufficient for exercise testing [Nail Clubbing] : no clubbing of the fingernails [Cyanosis, Localized] : no localized cyanosis [Petechial Hemorrhages (___cm)] : no petechial hemorrhages [Skin Color & Pigmentation] : normal skin color and pigmentation [] : no rash [No Venous Stasis] : no venous stasis [Skin Lesions] : no skin lesions [No Skin Ulcers] : no skin ulcer [No Xanthoma] : no  xanthoma was observed [Oriented To Time, Place, And Person] : oriented to person, place, and time [Affect] : the affect was normal [Mood] : the mood was normal [No Anxiety] : not feeling anxious [FreeTextEntry1] : grossly normal

## 2024-07-24 NOTE — PHYSICAL EXAM
[Well Developed] : well developed [Well Nourished] : well nourished [No Acute Distress] : no acute distress [Normal Conjunctiva] : normal conjunctiva [Normal Venous Pressure] : normal venous pressure [No Carotid Bruit] : no carotid bruit [Normal S1, S2] : normal S1, S2 [No Murmur] : no murmur [No Rub] : no rub [No Gallop] : no gallop [Clear Lung Fields] : clear lung fields [Good Air Entry] : good air entry [No Respiratory Distress] : no respiratory distress  [Soft] : abdomen soft [Non Tender] : non-tender [No Masses/organomegaly] : no masses/organomegaly [Normal Bowel Sounds] : normal bowel sounds [Normal Gait] : normal gait [No Edema] : no edema [No Cyanosis] : no cyanosis [No Clubbing] : no clubbing [No Varicosities] : no varicosities [No Rash] : no rash [No Skin Lesions] : no skin lesions [Moves all extremities] : moves all extremities [No Focal Deficits] : no focal deficits [Normal Speech] : normal speech [Alert and Oriented] : alert and oriented [Normal memory] : normal memory [General Appearance - Well Developed] : well developed [Normal Appearance] : normal appearance [Well Groomed] : well groomed [General Appearance - Well Nourished] : well nourished [No Deformities] : no deformities [General Appearance - In No Acute Distress] : no acute distress [Respiration, Rhythm And Depth] : normal respiratory rhythm and effort [Exaggerated Use Of Accessory Muscles For Inspiration] : no accessory muscle use [Auscultation Breath Sounds / Voice Sounds] : lungs were clear to auscultation bilaterally [Heart Rate And Rhythm] : heart rate and rhythm were normal [Heart Sounds] : normal S1 and S2 [Murmurs] : no murmurs present [Abdomen Soft] : soft [Abdomen Tenderness] : non-tender [Abdomen Mass (___ Cm)] : no abdominal mass palpated [Abnormal Walk] : normal gait [Gait - Sufficient For Exercise Testing] : the gait was sufficient for exercise testing [Nail Clubbing] : no clubbing of the fingernails [Cyanosis, Localized] : no localized cyanosis [Petechial Hemorrhages (___cm)] : no petechial hemorrhages [Skin Color & Pigmentation] : normal skin color and pigmentation [No Venous Stasis] : no venous stasis [] : no rash [Skin Lesions] : no skin lesions [No Skin Ulcers] : no skin ulcer [No Xanthoma] : no  xanthoma was observed [Oriented To Time, Place, And Person] : oriented to person, place, and time [Affect] : the affect was normal [Mood] : the mood was normal [No Anxiety] : not feeling anxious [FreeTextEntry1] : grossly normal

## 2024-07-24 NOTE — REASON FOR VISIT
[Other: ____] : [unfilled] [FreeTextEntry1] : Dinora has a past medical history of labile hypertension, GERD, obesity, varicose vein laser surgery, colon polyps, breast CA lumpectomy Jan 2023, RT chemo, moderate risk coronary calcium score 131 Sept 2022.  Patient has dyspnea with moderate exertion.  Cardiovascular review of symptoms is negative for exertional chest pain, palpitations, dizziness or syncope.  No PND or orthopnea leg edema.  No bleeding or black stool.  Patient is walking 30 minutes without exertional symptoms.  Average home blood pressures at guideline goal on amlodipine 5 mg daily.  Patient is currently following a low-sodium diet no alcohol or caffeine.  Patient does have anxiety and is benefiting from meditation.  Patient is active caring for her 5 grandchildren.  EKG Dec 2023 sinus rhythm anterior T wave inversions consistent with ischemia unchanged from prior EKG Sept 2022  Echocardiogram Sept 2022 LVEF 60%, mild MR and TR, mild PH  Carotid and abdominal ultrasound Sept 2022, mild nonobstructive plaque, normal abdominal aortic size.   Echocardiogram March 2021, LVEF 60%, mild MR and TR, mild PAH.   Labs 1/9/2021 WBC 7.68, Hgb 14.3, Hct 43, plat 250, Sodium 138, potassium 4.8, BUN 16, Creat 0.62  Renal doppler 1/10/2021 no sonographic evidence to suggest renal artery stenosis

## 2024-07-24 NOTE — DISCUSSION/SUMMARY
[FreeTextEntry1] : Patient has medical history detailed above and active medical issues including:  - Breast CA lumpectomy Jan 2023, completed RT now on chemo  - Dyspnea on exertion, multiple CAD risk factors, abnormal baseline EKG. Coronary CTA and coronary calcium score Sept 2022 nonobstructive coronaries, moderate coronary calcium score 131 total  - Labile hypertension average resting home BPs at guideline goal on amlodipine 5 mg daily.  - History of colon polyps  - Reflux GERD  - Obesity. Discussed calorie reduction and increased exercise as a weight reduction strategy.  - Leg varicose vein laser surgery Dr Wheat  Advised patient to follow active lifestyle with regular cardiovascular exercise. Patient educated on lifestyle and diet modification with low sodium low fat diet and avoidance of excessive alcohol. Patient is aware to call with any symptoms or concerns.  Cardiology follow-up 6 months. Current cardiac medications remain unchanged and renewals are up to date. Repeat labs will be ordered with PMD.  Dinora will follow up with Dr. Frank Albrecht for primary care  Total time spent 45 minutes, reviewing of test results, chart information, patient discussion, physical exam and completion of chart documentation.

## 2024-08-26 ENCOUNTER — OFFICE (OUTPATIENT)
Dept: URBAN - METROPOLITAN AREA CLINIC 8 | Facility: CLINIC | Age: 74
Setting detail: OPHTHALMOLOGY
End: 2024-08-26
Payer: MEDICARE

## 2024-08-26 DIAGNOSIS — L30.8: ICD-10-CM

## 2024-08-26 DIAGNOSIS — H25.13: ICD-10-CM

## 2024-08-26 DIAGNOSIS — H40.1131: ICD-10-CM

## 2024-08-26 DIAGNOSIS — H02.831: ICD-10-CM

## 2024-08-26 DIAGNOSIS — H02.834: ICD-10-CM

## 2024-08-26 PROCEDURE — 92133 CPTRZD OPH DX IMG PST SGM ON: CPT | Performed by: OPHTHALMOLOGY

## 2024-08-26 PROCEDURE — 92014 COMPRE OPH EXAM EST PT 1/>: CPT | Performed by: OPHTHALMOLOGY

## 2024-08-26 ASSESSMENT — CONFRONTATIONAL VISUAL FIELD TEST (CVF)
OD_FINDINGS: FULL
OS_FINDINGS: FULL

## 2024-08-26 ASSESSMENT — LID POSITION - DERMATOCHALASIS
OS_DERMATOCHALASIS: LUL
OD_DERMATOCHALASIS: RUL

## 2025-03-07 ENCOUNTER — OFFICE (OUTPATIENT)
Dept: URBAN - METROPOLITAN AREA CLINIC 38 | Facility: CLINIC | Age: 75
Setting detail: OPHTHALMOLOGY
End: 2025-03-07
Payer: MEDICARE

## 2025-03-07 DIAGNOSIS — H25.13: ICD-10-CM

## 2025-03-07 DIAGNOSIS — H40.1131: ICD-10-CM

## 2025-03-07 DIAGNOSIS — L30.8: ICD-10-CM

## 2025-03-07 DIAGNOSIS — H16.223: ICD-10-CM

## 2025-03-07 PROCEDURE — 99214 OFFICE O/P EST MOD 30 MIN: CPT | Performed by: STUDENT IN AN ORGANIZED HEALTH CARE EDUCATION/TRAINING PROGRAM

## 2025-03-07 PROCEDURE — 92020 GONIOSCOPY: CPT | Performed by: STUDENT IN AN ORGANIZED HEALTH CARE EDUCATION/TRAINING PROGRAM

## 2025-03-07 PROCEDURE — 92083 EXTENDED VISUAL FIELD XM: CPT | Performed by: STUDENT IN AN ORGANIZED HEALTH CARE EDUCATION/TRAINING PROGRAM

## 2025-03-07 PROCEDURE — 92133 CPTRZD OPH DX IMG PST SGM ON: CPT | Performed by: STUDENT IN AN ORGANIZED HEALTH CARE EDUCATION/TRAINING PROGRAM

## 2025-03-07 ASSESSMENT — REFRACTION_CURRENTRX
OD_AXIS: 009
OD_CYLINDER: -0.75
OD_SPHERE: -0.50
OD_VPRISM_DIRECTION: PROGS
OS_SPHERE: -0.75
OS_VPRISM_DIRECTION: PROGS
OS_ADD: +2.25
OD_ADD: +2.25
OD_OVR_VA: 20/
OS_OVR_VA: 20/
OD_VPRISM_DIRECTION: PROGS
OD_ADD: +2.25
OS_CYLINDER: SPHERE
OS_CYLINDER: SPH
OS_ADD: +2.25
OS_OVR_VA: 20/
OS_VPRISM_DIRECTION: PROGS
OD_SPHERE: -0.25
OD_OVR_VA: 20/
OD_CYLINDER: -0.75
OS_SPHERE: -0.75
OD_AXIS: 013

## 2025-03-07 ASSESSMENT — KERATOMETRY
OD_K2POWER_DIOPTERS: 46.25
OS_AXISANGLE_DEGREES: 100
METHOD_AUTO_MANUAL: AUTO
OD_AXISANGLE_DEGREES: 093
OS_K2POWER_DIOPTERS: 45.25
OD_K1POWER_DIOPTERS: 44.25
OS_K1POWER_DIOPTERS: 44.25

## 2025-03-07 ASSESSMENT — REFRACTION_AUTOREFRACTION
OS_SPHERE: -0.50
OD_AXIS: 029
OD_CYLINDER: -1.00
OS_AXIS: 052
OS_CYLINDER: -0.25
OD_SPHERE: +0.25

## 2025-03-07 ASSESSMENT — REFRACTION_MANIFEST
OS_CYLINDER: -0.25
OD_VA1: 20/25
OS_AXIS: 050
OD_CYLINDER: -0.75
OU_VA: 20/25
OS_VA1: 20/30
OS_VA2: 20/20(J1+)
OD_VA2: 20/20(J1+)
OD_AXIS: 030
OS_SPHERE: -1.00
OD_ADD: +2.25
OS_ADD: +2.25
OD_SPHERE: PLANO

## 2025-03-07 ASSESSMENT — CONFRONTATIONAL VISUAL FIELD TEST (CVF)
OS_FINDINGS: FULL
OD_FINDINGS: FULL

## 2025-03-07 ASSESSMENT — VISUAL ACUITY
OS_BCVA: 20/30-2
OD_BCVA: 20/40-

## 2025-03-07 ASSESSMENT — LID POSITION - DERMATOCHALASIS
OD_DERMATOCHALASIS: RUL
OS_DERMATOCHALASIS: LUL

## 2025-03-07 ASSESSMENT — SUPERFICIAL PUNCTATE KERATITIS (SPK)
OS_SPK: 1+
OD_SPK: 1+

## 2025-05-26 LAB — HBA1C MFR BLD HPLC: 4.9

## 2025-06-03 ENCOUNTER — APPOINTMENT (OUTPATIENT)
Dept: CARDIOLOGY | Facility: CLINIC | Age: 75
End: 2025-06-03
Payer: MEDICARE

## 2025-06-03 ENCOUNTER — NON-APPOINTMENT (OUTPATIENT)
Age: 75
End: 2025-06-03

## 2025-06-03 VITALS
SYSTOLIC BLOOD PRESSURE: 138 MMHG | BODY MASS INDEX: 33.8 KG/M2 | HEIGHT: 58 IN | WEIGHT: 161 LBS | HEART RATE: 62 BPM | DIASTOLIC BLOOD PRESSURE: 88 MMHG | OXYGEN SATURATION: 98 %

## 2025-06-03 DIAGNOSIS — M94.261 CHONDROMALACIA, RIGHT KNEE: ICD-10-CM

## 2025-06-03 DIAGNOSIS — R94.31 ABNORMAL ELECTROCARDIOGRAM [ECG] [EKG]: ICD-10-CM

## 2025-06-03 DIAGNOSIS — I25.10 ATHEROSCLEROTIC HEART DISEASE OF NATIVE CORONARY ARTERY W/OUT ANGINA PECTORIS: ICD-10-CM

## 2025-06-03 DIAGNOSIS — R93.1 ABNORMAL FINDINGS ON DIAGNOSTIC IMAGING OF HEART AND CORONARY CIRCULATION: ICD-10-CM

## 2025-06-03 DIAGNOSIS — R06.09 OTHER FORMS OF DYSPNEA: ICD-10-CM

## 2025-06-03 DIAGNOSIS — I10 ESSENTIAL (PRIMARY) HYPERTENSION: ICD-10-CM

## 2025-06-03 DIAGNOSIS — K21.9 GASTRO-ESOPHAGEAL REFLUX DISEASE W/OUT ESOPHAGITIS: ICD-10-CM

## 2025-06-03 DIAGNOSIS — E66.3 OVERWEIGHT: ICD-10-CM

## 2025-06-03 PROCEDURE — 93978 VASCULAR STUDY: CPT

## 2025-06-03 PROCEDURE — 93880 EXTRACRANIAL BILAT STUDY: CPT

## 2025-06-03 PROCEDURE — 93306 TTE W/DOPPLER COMPLETE: CPT

## 2025-06-03 PROCEDURE — 99215 OFFICE O/P EST HI 40 MIN: CPT

## 2025-06-20 ENCOUNTER — OFFICE (OUTPATIENT)
Dept: URBAN - METROPOLITAN AREA CLINIC 38 | Facility: CLINIC | Age: 75
Setting detail: OPHTHALMOLOGY
End: 2025-06-20
Payer: MEDICARE

## 2025-06-20 DIAGNOSIS — H40.1132: ICD-10-CM

## 2025-06-20 DIAGNOSIS — H25.13: ICD-10-CM

## 2025-06-20 DIAGNOSIS — H25.12: ICD-10-CM

## 2025-06-20 PROBLEM — H40.1112 PRIMARY OPEN ANGLE GLAUCOMA; RIGHT EYE MODERATE, LEFT EYE MODERATE: Status: ACTIVE | Noted: 2025-06-20

## 2025-06-20 PROBLEM — H40.1122 PRIMARY OPEN ANGLE GLAUCOMA; RIGHT EYE MODERATE, LEFT EYE MODERATE: Status: ACTIVE | Noted: 2025-06-20

## 2025-06-20 PROCEDURE — 92136 OPHTHALMIC BIOMETRY: CPT | Mod: 26,LT | Performed by: STUDENT IN AN ORGANIZED HEALTH CARE EDUCATION/TRAINING PROGRAM

## 2025-06-20 PROCEDURE — 99213 OFFICE O/P EST LOW 20 MIN: CPT | Performed by: STUDENT IN AN ORGANIZED HEALTH CARE EDUCATION/TRAINING PROGRAM

## 2025-06-20 PROCEDURE — 92136 OPHTHALMIC BIOMETRY: CPT | Mod: TC | Performed by: STUDENT IN AN ORGANIZED HEALTH CARE EDUCATION/TRAINING PROGRAM

## 2025-06-20 ASSESSMENT — KERATOMETRY
OD_K1POWER_DIOPTERS: 43.75
OD_K2POWER_DIOPTERS: 45.75
OS_AXISANGLE_DEGREES: 100
OD_AXISANGLE_DEGREES: 094
OS_K1POWER_DIOPTERS: 44.00
METHOD_AUTO_MANUAL: AUTO
OS_K2POWER_DIOPTERS: 45.75

## 2025-06-20 ASSESSMENT — REFRACTION_MANIFEST
OS_CYLINDER: -0.25
OS_ADD: +2.25
OD_CYLINDER: -0.75
OD_SPHERE: PLANO
OD_AXIS: 030
OU_VA: NO
OD_VA2: 20/20(J1+)
OD_ADD: +2.25
OS_AXIS: 050
OS_VA2: 20/20(J1+)
OS_SPHERE: -1.00

## 2025-06-20 ASSESSMENT — REFRACTION_CURRENTRX
OD_AXIS: 013
OS_VPRISM_DIRECTION: PROGS
OD_VPRISM_DIRECTION: PROGS
OD_CYLINDER: -0.75
OD_ADD: +2.25
OS_ADD: +2.25
OS_SPHERE: -0.75
OD_AXIS: 009
OS_ADD: +2.25
OD_ADD: +2.25
OD_OVR_VA: 20/
OD_SPHERE: -0.50
OD_SPHERE: -0.25
OS_CYLINDER: SPH
OS_OVR_VA: 20/
OD_VPRISM_DIRECTION: PROGS
OD_OVR_VA: 20/
OS_OVR_VA: 20/
OS_SPHERE: -0.75
OS_VPRISM_DIRECTION: PROGS
OD_CYLINDER: -0.75
OS_CYLINDER: SPHERE

## 2025-06-20 ASSESSMENT — REFRACTION_AUTOREFRACTION
OD_SPHERE: +0.50
OS_SPHERE: -0.25
OD_AXIS: 018
OS_CYLINDER: -0.75
OD_CYLINDER: -1.00
OS_AXIS: 001

## 2025-06-20 ASSESSMENT — LID POSITION - DERMATOCHALASIS
OS_DERMATOCHALASIS: LUL
OD_DERMATOCHALASIS: RUL

## 2025-06-20 ASSESSMENT — SUPERFICIAL PUNCTATE KERATITIS (SPK)
OS_SPK: 1+
OD_SPK: 1+

## 2025-06-20 ASSESSMENT — CONFRONTATIONAL VISUAL FIELD TEST (CVF)
OD_FINDINGS: FULL
OS_FINDINGS: FULL

## 2025-06-20 ASSESSMENT — VISUAL ACUITY
OS_BCVA: 20/30-2
OD_BCVA: 20/40

## 2025-07-02 ENCOUNTER — AMBULATORY SURGERY CENTER (OUTPATIENT)
Dept: URBAN - METROPOLITAN AREA SURGERY 4 | Facility: SURGERY | Age: 75
Setting detail: OPHTHALMOLOGY
End: 2025-07-02
Payer: MEDICARE

## 2025-07-02 DIAGNOSIS — H52.222: ICD-10-CM

## 2025-07-02 DIAGNOSIS — H25.12: ICD-10-CM

## 2025-07-02 DIAGNOSIS — H40.1121: ICD-10-CM

## 2025-07-02 PROCEDURE — S9986 NOT MEDICALLY NECESSARY SVC: HCPCS | Mod: GX,GY | Performed by: STUDENT IN AN ORGANIZED HEALTH CARE EDUCATION/TRAINING PROGRAM

## 2025-07-02 PROCEDURE — 66984 XCAPSL CTRC RMVL W/O ECP: CPT | Mod: LT | Performed by: STUDENT IN AN ORGANIZED HEALTH CARE EDUCATION/TRAINING PROGRAM

## 2025-07-02 PROCEDURE — FEMTO PRECISION LASER CATARACT SURGERY: Mod: GY | Performed by: STUDENT IN AN ORGANIZED HEALTH CARE EDUCATION/TRAINING PROGRAM

## 2025-07-02 PROCEDURE — 65820 GONIOTOMY: CPT | Mod: LT | Performed by: STUDENT IN AN ORGANIZED HEALTH CARE EDUCATION/TRAINING PROGRAM

## 2025-07-03 ENCOUNTER — OFFICE (OUTPATIENT)
Dept: URBAN - METROPOLITAN AREA CLINIC 38 | Facility: CLINIC | Age: 75
Setting detail: OPHTHALMOLOGY
End: 2025-07-03
Payer: MEDICARE

## 2025-07-03 ENCOUNTER — RX ONLY (RX ONLY)
Age: 75
End: 2025-07-03

## 2025-07-03 DIAGNOSIS — Z96.1: ICD-10-CM

## 2025-07-03 PROCEDURE — 99024 POSTOP FOLLOW-UP VISIT: CPT | Performed by: STUDENT IN AN ORGANIZED HEALTH CARE EDUCATION/TRAINING PROGRAM

## 2025-07-03 ASSESSMENT — KERATOMETRY
OS_AXISANGLE_DEGREES: 079
OS_K2POWER_DIOPTERS: 44.75
OS_K1POWER_DIOPTERS: 43.75
OD_K1POWER_DIOPTERS: 44.25
OD_K2POWER_DIOPTERS: 46.00
OD_AXISANGLE_DEGREES: 100
METHOD_AUTO_MANUAL: AUTO

## 2025-07-03 ASSESSMENT — REFRACTION_MANIFEST
OD_SPHERE: PLANO
OD_VA2: 20/20(J1+)
OS_AXIS: 050
OD_CYLINDER: -0.75
OS_SPHERE: -1.00
OS_ADD: +2.25
OS_VA2: 20/20(J1+)
OD_ADD: +2.25
OS_CYLINDER: -0.25
OU_VA: NO
OD_AXIS: 030

## 2025-07-03 ASSESSMENT — REFRACTION_CURRENTRX
OS_CYLINDER: SPH
OD_SPHERE: -0.25
OD_SPHERE: -0.50
OD_VPRISM_DIRECTION: PROGS
OS_CYLINDER: SPHERE
OD_CYLINDER: -0.75
OD_CYLINDER: -0.75
OS_SPHERE: -0.75
OS_VPRISM_DIRECTION: PROGS
OD_OVR_VA: 20/
OS_SPHERE: -0.75
OD_ADD: +2.25
OS_OVR_VA: 20/
OS_VPRISM_DIRECTION: PROGS
OD_ADD: +2.25
OS_ADD: +2.25
OS_OVR_VA: 20/
OD_AXIS: 013
OS_ADD: +2.25
OD_VPRISM_DIRECTION: PROGS
OD_OVR_VA: 20/
OD_AXIS: 009

## 2025-07-03 ASSESSMENT — CONFRONTATIONAL VISUAL FIELD TEST (CVF)
OD_FINDINGS: FULL
OS_FINDINGS: FULL

## 2025-07-03 ASSESSMENT — LID POSITION - DERMATOCHALASIS
OD_DERMATOCHALASIS: RUL
OS_DERMATOCHALASIS: LUL

## 2025-07-03 ASSESSMENT — REFRACTION_AUTOREFRACTION
OS_CYLINDER: -0.75
OD_CYLINDER: -2.00
OS_AXIS: 148
OD_SPHERE: PLANO
OS_SPHERE: PLANO
OD_AXIS: 012

## 2025-07-03 ASSESSMENT — SUPERFICIAL PUNCTATE KERATITIS (SPK)
OD_SPK: 1+
OS_SPK: 1+

## 2025-07-03 ASSESSMENT — VISUAL ACUITY
OS_BCVA: 20/30-2
OD_BCVA: 20/70-

## 2025-07-11 ENCOUNTER — OFFICE (OUTPATIENT)
Dept: URBAN - METROPOLITAN AREA CLINIC 38 | Facility: CLINIC | Age: 75
Setting detail: OPHTHALMOLOGY
End: 2025-07-11
Payer: MEDICARE

## 2025-07-11 DIAGNOSIS — H25.11: ICD-10-CM

## 2025-07-11 PROCEDURE — 92136 OPHTHALMIC BIOMETRY: CPT | Mod: 26,RT | Performed by: STUDENT IN AN ORGANIZED HEALTH CARE EDUCATION/TRAINING PROGRAM

## 2025-07-11 ASSESSMENT — REFRACTION_CURRENTRX
OS_SPHERE: -0.75
OD_ADD: +2.25
OD_CYLINDER: -0.75
OD_OVR_VA: 20/
OS_ADD: +2.25
OS_CYLINDER: SPH
OD_OVR_VA: 20/
OS_VPRISM_DIRECTION: PROGS
OD_SPHERE: -0.50
OD_VPRISM_DIRECTION: PROGS
OS_ADD: +2.25
OS_OVR_VA: 20/
OD_AXIS: 013
OS_SPHERE: -0.75
OD_CYLINDER: -0.75
OD_AXIS: 009
OS_OVR_VA: 20/
OD_ADD: +2.25
OD_VPRISM_DIRECTION: PROGS
OS_VPRISM_DIRECTION: PROGS
OD_SPHERE: -0.25
OS_CYLINDER: SPHERE

## 2025-07-11 ASSESSMENT — KERATOMETRY
METHOD_AUTO_MANUAL: AUTO
OD_AXISANGLE_DEGREES: 095
OD_K2POWER_DIOPTERS: 45.50
OD_K1POWER_DIOPTERS: 43.75
OS_K1POWER_DIOPTERS: 44.00
OS_K2POWER_DIOPTERS: 45.25
OS_AXISANGLE_DEGREES: 100

## 2025-07-11 ASSESSMENT — REFRACTION_MANIFEST
OS_SPHERE: PLANO
OD_AXIS: 005
OD_ADD: +2.25
OD_VA1: 20/30
OS_VA1: 20/20
OU_VA: 20/30
OD_VA2: 20/20(J1+)
OS_VA2: 20/20(J1+)
OD_CYLINDER: -0.50
OS_CYLINDER: SPHERE
OD_SPHERE: PLANO
OS_ADD: +2.25

## 2025-07-11 ASSESSMENT — TONOMETRY
OS_IOP_MMHG: 15
OD_IOP_MMHG: 19

## 2025-07-11 ASSESSMENT — REFRACTION_AUTOREFRACTION
OD_SPHERE: PLANO
OD_CYLINDER: -0.50
OD_AXIS: 004
OS_CYLINDER: -0.75
OS_SPHERE: +0.25
OS_AXIS: 015

## 2025-07-11 ASSESSMENT — CONFRONTATIONAL VISUAL FIELD TEST (CVF)
OS_FINDINGS: FULL
OD_FINDINGS: FULL

## 2025-07-11 ASSESSMENT — LID POSITION - DERMATOCHALASIS
OD_DERMATOCHALASIS: RUL
OS_DERMATOCHALASIS: LUL

## 2025-07-11 ASSESSMENT — VISUAL ACUITY
OD_BCVA: 20/20-2
OS_BCVA: 20/30-2

## 2025-07-11 ASSESSMENT — SUPERFICIAL PUNCTATE KERATITIS (SPK)
OS_SPK: 1+
OD_SPK: 1+

## 2025-07-16 ENCOUNTER — AMBULATORY SURGERY CENTER (OUTPATIENT)
Dept: URBAN - METROPOLITAN AREA SURGERY 4 | Facility: SURGERY | Age: 75
Setting detail: OPHTHALMOLOGY
End: 2025-07-16
Payer: MEDICARE

## 2025-07-16 DIAGNOSIS — H52.221: ICD-10-CM

## 2025-07-16 DIAGNOSIS — H25.11: ICD-10-CM

## 2025-07-16 DIAGNOSIS — H40.1111: ICD-10-CM

## 2025-07-16 PROCEDURE — 66984 XCAPSL CTRC RMVL W/O ECP: CPT | Mod: 79,RT | Performed by: STUDENT IN AN ORGANIZED HEALTH CARE EDUCATION/TRAINING PROGRAM

## 2025-07-16 PROCEDURE — FEMTO PRECISION LASER CATARACT SURGERY: Mod: GY | Performed by: STUDENT IN AN ORGANIZED HEALTH CARE EDUCATION/TRAINING PROGRAM

## 2025-07-16 PROCEDURE — S9986 NOT MEDICALLY NECESSARY SVC: HCPCS | Mod: GX,GY | Performed by: STUDENT IN AN ORGANIZED HEALTH CARE EDUCATION/TRAINING PROGRAM

## 2025-07-16 PROCEDURE — 65820 GONIOTOMY: CPT | Mod: 79,RT | Performed by: STUDENT IN AN ORGANIZED HEALTH CARE EDUCATION/TRAINING PROGRAM

## 2025-07-17 ENCOUNTER — RX ONLY (RX ONLY)
Age: 75
End: 2025-07-17

## 2025-07-17 ENCOUNTER — OFFICE (OUTPATIENT)
Dept: URBAN - METROPOLITAN AREA CLINIC 38 | Facility: CLINIC | Age: 75
Setting detail: OPHTHALMOLOGY
End: 2025-07-17
Payer: MEDICARE

## 2025-07-17 DIAGNOSIS — Z96.1: ICD-10-CM

## 2025-07-17 PROCEDURE — 99024 POSTOP FOLLOW-UP VISIT: CPT | Performed by: STUDENT IN AN ORGANIZED HEALTH CARE EDUCATION/TRAINING PROGRAM

## 2025-07-17 ASSESSMENT — REFRACTION_CURRENTRX
OS_OVR_VA: 20/
OS_SPHERE: -0.75
OD_ADD: +2.25
OS_VPRISM_DIRECTION: PROGS
OD_CYLINDER: -0.75
OD_CYLINDER: -0.75
OS_VPRISM_DIRECTION: PROGS
OD_OVR_VA: 20/
OD_SPHERE: -0.25
OD_VPRISM_DIRECTION: PROGS
OS_ADD: +2.25
OS_OVR_VA: 20/
OS_SPHERE: -0.75
OD_AXIS: 009
OS_CYLINDER: SPHERE
OD_SPHERE: -0.50
OD_ADD: +2.25
OD_AXIS: 013
OS_ADD: +2.25
OD_VPRISM_DIRECTION: PROGS
OS_CYLINDER: SPH
OD_OVR_VA: 20/

## 2025-07-17 ASSESSMENT — KERATOMETRY
OS_AXISANGLE_DEGREES: 093
OD_AXISANGLE_DEGREES: 098
OD_K2POWER_DIOPTERS: 47.25
OS_K2POWER_DIOPTERS: 45.50
OS_K1POWER_DIOPTERS: 43.75
OD_K1POWER_DIOPTERS: 44.00
METHOD_AUTO_MANUAL: AUTO

## 2025-07-17 ASSESSMENT — REFRACTION_MANIFEST
OD_SPHERE: PLANO
OS_VA2: 20/20(J1+)
OD_AXIS: 005
OU_VA: 20/30
OS_VA1: 20/20
OD_ADD: +2.25
OD_VA2: 20/20(J1+)
OS_SPHERE: PLANO
OD_VA1: 20/30
OS_ADD: +2.25
OD_CYLINDER: -0.50
OS_CYLINDER: SPHERE

## 2025-07-17 ASSESSMENT — LID POSITION - DERMATOCHALASIS
OS_DERMATOCHALASIS: LUL
OD_DERMATOCHALASIS: RUL

## 2025-07-17 ASSESSMENT — CONFRONTATIONAL VISUAL FIELD TEST (CVF)
OD_FINDINGS: FULL
OS_FINDINGS: FULL

## 2025-07-17 ASSESSMENT — REFRACTION_AUTOREFRACTION
OD_AXIS: 011
OS_CYLINDER: -1.25
OD_SPHERE: +0.25
OD_CYLINDER: -1.75
OS_AXIS: 003
OS_SPHERE: +0.50

## 2025-07-17 ASSESSMENT — TONOMETRY
OD_IOP_MMHG: 17
OS_IOP_MMHG: 20

## 2025-07-17 ASSESSMENT — SUPERFICIAL PUNCTATE KERATITIS (SPK)
OS_SPK: 1+
OD_SPK: 1+

## 2025-07-17 ASSESSMENT — VISUAL ACUITY
OS_BCVA: 20/50+2
OD_BCVA: 20/20-2

## 2025-07-25 ENCOUNTER — OFFICE (OUTPATIENT)
Dept: URBAN - METROPOLITAN AREA CLINIC 38 | Facility: CLINIC | Age: 75
Setting detail: OPHTHALMOLOGY
End: 2025-07-25
Payer: MEDICARE

## 2025-07-25 DIAGNOSIS — Z96.1: ICD-10-CM

## 2025-07-25 PROCEDURE — 99024 POSTOP FOLLOW-UP VISIT: CPT | Performed by: STUDENT IN AN ORGANIZED HEALTH CARE EDUCATION/TRAINING PROGRAM

## 2025-07-25 ASSESSMENT — REFRACTION_MANIFEST
OS_SPHERE: -0.50
OS_VA1: 20/20-2
OD_AXIS: 015
OS_VA2: 20/20(J1+)
OD_ADD: +3.00
OS_ADD: +3.00
OD_SPHERE: -0.25
OD_VA1: 20/20-2
OS_AXIS: 020
OU_VA: 20/20-
OS_CYLINDER: -0.25
OD_VA2: 20/20(J1+)
OD_CYLINDER: -1.25

## 2025-07-25 ASSESSMENT — REFRACTION_AUTOREFRACTION
OS_AXIS: 020
OD_AXIS: 016
OD_CYLINDER: -1.00
OD_SPHERE: +0.25
OS_CYLINDER: -0.50
OS_SPHERE: +0.50

## 2025-07-25 ASSESSMENT — SUPERFICIAL PUNCTATE KERATITIS (SPK)
OS_SPK: 1+
OD_SPK: 1+

## 2025-07-25 ASSESSMENT — REFRACTION_CURRENTRX
OS_VPRISM_DIRECTION: PROGS
OS_ADD: +2.25
OD_CYLINDER: -0.75
OD_OVR_VA: 20/
OS_SPHERE: -0.75
OS_SPHERE: -0.75
OS_VPRISM_DIRECTION: PROGS
OD_SPHERE: -0.25
OS_CYLINDER: SPHERE
OD_VPRISM_DIRECTION: PROGS
OD_OVR_VA: 20/
OD_AXIS: 009
OD_ADD: +2.25
OS_OVR_VA: 20/
OS_CYLINDER: SPH
OS_OVR_VA: 20/
OD_VPRISM_DIRECTION: PROGS
OD_CYLINDER: -0.75
OD_AXIS: 013
OS_ADD: +2.25
OD_SPHERE: -0.50
OD_ADD: +2.25

## 2025-07-25 ASSESSMENT — LID POSITION - DERMATOCHALASIS
OS_DERMATOCHALASIS: LUL
OD_DERMATOCHALASIS: RUL

## 2025-07-25 ASSESSMENT — KERATOMETRY
OD_K2POWER_DIOPTERS: 45.25
OS_K2POWER_DIOPTERS: 44.75
OS_K1POWER_DIOPTERS: 43.75
METHOD_AUTO_MANUAL: AUTO
OS_AXISANGLE_DEGREES: 109
OD_AXISANGLE_DEGREES: 104
OD_K1POWER_DIOPTERS: 43.50

## 2025-07-25 ASSESSMENT — CONFRONTATIONAL VISUAL FIELD TEST (CVF)
OD_FINDINGS: FULL
OS_FINDINGS: FULL

## 2025-07-25 ASSESSMENT — TONOMETRY
OD_IOP_MMHG: 19
OS_IOP_MMHG: 14

## 2025-07-25 ASSESSMENT — VISUAL ACUITY
OS_BCVA: 20/25-2
OD_BCVA: 20/25-2

## 2025-08-20 ENCOUNTER — OFFICE (OUTPATIENT)
Dept: URBAN - METROPOLITAN AREA CLINIC 38 | Facility: CLINIC | Age: 75
Setting detail: OPHTHALMOLOGY
End: 2025-08-20
Payer: MEDICARE

## 2025-08-20 DIAGNOSIS — H02.015: ICD-10-CM

## 2025-08-20 DIAGNOSIS — Z96.1: ICD-10-CM

## 2025-08-20 DIAGNOSIS — H02.012: ICD-10-CM

## 2025-08-20 PROCEDURE — 99024 POSTOP FOLLOW-UP VISIT: CPT

## 2025-08-20 ASSESSMENT — REFRACTION_CURRENTRX
OD_OVR_VA: 20/
OS_VPRISM_DIRECTION: PROGS
OD_ADD: +2.25
OS_ADD: +2.25
OS_CYLINDER: SPH
OD_CYLINDER: -0.75
OD_SPHERE: -0.25
OD_VPRISM_DIRECTION: PROGS
OD_ADD: +2.25
OS_ADD: +2.25
OD_AXIS: 009
OS_OVR_VA: 20/
OD_CYLINDER: -0.75
OD_VPRISM_DIRECTION: PROGS
OD_AXIS: 013
OD_SPHERE: -0.50
OD_OVR_VA: 20/
OS_OVR_VA: 20/
OS_VPRISM_DIRECTION: PROGS
OS_SPHERE: -0.75
OS_SPHERE: -0.75
OS_CYLINDER: SPHERE

## 2025-08-20 ASSESSMENT — KERATOMETRY
METHOD_AUTO_MANUAL: AUTO
OS_AXISANGLE_DEGREES: 093
OD_AXISANGLE_DEGREES: 100
OD_K2POWER_DIOPTERS: 45.00
OS_K2POWER_DIOPTERS: 45.50
OD_K1POWER_DIOPTERS: 43.50
OS_K1POWER_DIOPTERS: 44.00

## 2025-08-20 ASSESSMENT — REFRACTION_MANIFEST
OS_SPHERE: -0.50
OD_VA1: 20/20-2
OD_AXIS: 015
OS_VA1: 20/20-2
OU_VA: 20/20-
OS_VA2: 20/20(J1+)
OS_AXIS: 020
OD_CYLINDER: -1.25
OD_VA2: 20/20(J1+)
OD_ADD: +3.00
OD_SPHERE: -0.25
OS_CYLINDER: -0.25
OS_ADD: +3.00

## 2025-08-20 ASSESSMENT — REFRACTION_AUTOREFRACTION
OD_SPHERE: +0.25
OS_CYLINDER: -1.50
OD_AXIS: 019
OS_AXIS: 179
OD_CYLINDER: -1.00
OS_SPHERE: +0.50

## 2025-08-20 ASSESSMENT — LID EXAM ASSESSMENTS
OD_TRICHIASIS: RLL
OS_TRICHIASIS: LLL

## 2025-08-20 ASSESSMENT — VISUAL ACUITY
OD_BCVA: 20/30-
OS_BCVA: 20/30+

## 2025-08-20 ASSESSMENT — CONFRONTATIONAL VISUAL FIELD TEST (CVF)
OD_FINDINGS: FULL
OS_FINDINGS: FULL

## 2025-08-20 ASSESSMENT — TONOMETRY
OS_IOP_MMHG: 17
OD_IOP_MMHG: 17

## 2025-08-20 ASSESSMENT — SUPERFICIAL PUNCTATE KERATITIS (SPK)
OD_SPK: 1+
OS_SPK: 1+

## 2025-08-20 ASSESSMENT — LID POSITION - DERMATOCHALASIS
OS_DERMATOCHALASIS: LUL
OD_DERMATOCHALASIS: RUL

## 2025-08-26 ENCOUNTER — RX ONLY (RX ONLY)
Age: 75
End: 2025-08-26

## 2025-08-26 ENCOUNTER — OFFICE (OUTPATIENT)
Dept: URBAN - METROPOLITAN AREA CLINIC 8 | Facility: CLINIC | Age: 75
Setting detail: OPHTHALMOLOGY
End: 2025-08-26
Payer: MEDICARE

## 2025-08-26 DIAGNOSIS — L30.8: ICD-10-CM

## 2025-08-26 PROBLEM — H02.012 TRICHIASIS; LEFT LOWER LID, RIGHT LOWER LID: Status: ACTIVE | Noted: 2025-08-20

## 2025-08-26 PROBLEM — H02.015 TRICHIASIS; LEFT LOWER LID, RIGHT LOWER LID: Status: ACTIVE | Noted: 2025-08-20

## 2025-08-26 PROCEDURE — 99213 OFFICE O/P EST LOW 20 MIN: CPT | Mod: 24

## 2025-08-26 ASSESSMENT — KERATOMETRY
METHOD_AUTO_MANUAL: AUTO
OS_K2POWER_DIOPTERS: 45.50
OD_K2POWER_DIOPTERS: 45.00
OD_K1POWER_DIOPTERS: 43.50
OD_AXISANGLE_DEGREES: 100
OS_K1POWER_DIOPTERS: 44.00
OS_AXISANGLE_DEGREES: 093

## 2025-08-26 ASSESSMENT — REFRACTION_CURRENTRX
OD_OVR_VA: 20/
OS_OVR_VA: 20/
OS_OVR_VA: 20/
OS_ADD: +2.25
OD_SPHERE: -0.25
OD_SPHERE: -0.50
OD_ADD: +2.25
OS_SPHERE: -0.75
OS_CYLINDER: SPH
OS_VPRISM_DIRECTION: PROGS
OS_ADD: +2.25
OD_VPRISM_DIRECTION: PROGS
OD_OVR_VA: 20/
OD_VPRISM_DIRECTION: PROGS
OD_CYLINDER: -0.75
OS_VPRISM_DIRECTION: PROGS
OD_AXIS: 013
OS_CYLINDER: SPHERE
OD_CYLINDER: -0.75
OS_SPHERE: -0.75
OD_ADD: +2.25
OD_AXIS: 009

## 2025-08-26 ASSESSMENT — LID EXAM ASSESSMENTS
OS_TRICHIASIS: ABSENT
OD_TRICHIASIS: ABSENT

## 2025-08-26 ASSESSMENT — REFRACTION_MANIFEST
OS_SPHERE: -0.50
OS_AXIS: 020
OD_SPHERE: -0.25
OS_VA1: 20/20-2
OS_CYLINDER: -0.25
OD_VA2: 20/20(J1+)
OS_VA2: 20/20(J1+)
OD_CYLINDER: -1.25
OD_VA1: 20/20-2
OS_ADD: +3.00
OD_ADD: +3.00
OU_VA: 20/20-
OD_AXIS: 015

## 2025-08-26 ASSESSMENT — VISUAL ACUITY
OD_BCVA: 20/25-
OS_BCVA: 20/25-2

## 2025-08-26 ASSESSMENT — REFRACTION_AUTOREFRACTION
OS_SPHERE: +0.50
OD_AXIS: 019
OS_CYLINDER: -1.50
OD_CYLINDER: -1.00
OD_SPHERE: +0.25
OS_AXIS: 179

## 2025-08-26 ASSESSMENT — CONFRONTATIONAL VISUAL FIELD TEST (CVF)
OS_FINDINGS: FULL
OD_FINDINGS: FULL

## 2025-08-26 ASSESSMENT — SUPERFICIAL PUNCTATE KERATITIS (SPK)
OD_SPK: 1+
OS_SPK: 1+

## 2025-08-26 ASSESSMENT — LID POSITION - DERMATOCHALASIS
OS_DERMATOCHALASIS: LUL
OD_DERMATOCHALASIS: RUL